# Patient Record
Sex: FEMALE | Race: WHITE
[De-identification: names, ages, dates, MRNs, and addresses within clinical notes are randomized per-mention and may not be internally consistent; named-entity substitution may affect disease eponyms.]

---

## 2019-02-08 ENCOUNTER — HOSPITAL ENCOUNTER (EMERGENCY)
Dept: HOSPITAL 25 - UCCORT | Age: 44
Discharge: HOME | End: 2019-02-08
Payer: COMMERCIAL

## 2019-02-08 VITALS — SYSTOLIC BLOOD PRESSURE: 113 MMHG | DIASTOLIC BLOOD PRESSURE: 75 MMHG

## 2019-02-08 DIAGNOSIS — R42: ICD-10-CM

## 2019-02-08 DIAGNOSIS — M62.830: Primary | ICD-10-CM

## 2019-02-08 PROCEDURE — 99212 OFFICE O/P EST SF 10 MIN: CPT

## 2019-02-08 PROCEDURE — G0463 HOSPITAL OUTPT CLINIC VISIT: HCPCS

## 2019-02-08 PROCEDURE — 81003 URINALYSIS AUTO W/O SCOPE: CPT

## 2019-02-08 NOTE — UC
Back Pain HPI





- HPI Summary


HPI Summary: 





Pt c/o sudden onset of low back pain ~ 2 weeks ago after sitting down on floor 

after brief episode of feeling light headed. Pt sates that since initial 

episode of lightheadedness and sudden sitting to ground, pt has had 

intermittent episodes of low back spasms, pain an dstiffness that  are relieved 

by movement, NSAIDS and stretching. Pt has been doing ADL's with out difficulty 

and has been exercising per norm.  





- History of Current Complaint


Chief Complaint: UCBackPain


Stated Complaint: LOW BACK PAIN


Time Seen by Provider: 02/08/19 16:42


Hx Obtained From: Patient


Pregnant?: No


Onset/Duration: Sudden Onset, Lasting Weeks


Timing: Intermittent, Lasting Minutes


Severity Initially: Moderate


Severity Currently: Mild


Pain Intensity: 0


Back Pain: Is Discrete @ - left lower back


Character: Dull, Aching, Spasmodic, Stiffness


Aggravating Factor(s): Movement, Lifting, Bending


Alleviating Factor(s): Rest, Position


Associated Signs And Symptoms: Positive: Negative





- Risk Factors


AAA Risk Factors: Negative


TAD Risk Factors: Negative


Cauda Equina Risk Factors: Negative


Epidural Abscess Risk Factors: Negative





- Allergies/Home Medications


Allergies/Adverse Reactions: 


 Allergies











Allergy/AdvReac Type Severity Reaction Status Date / Time


 


No Known Allergies Allergy   Verified 02/08/19 16:04














PMH/Surg Hx/FS Hx/Imm Hx


Previously Healthy: Yes





- Surgical History


Surgical History: Yes


Surgery Procedure, Year, and Place: HYSTERECTOMY





- Family History


Known Family History: Positive: Cardiac Disease





- Social History


Occupation: Employed Full-time


Lives: With Family


Alcohol Use: Rare


Substance Use Type: None


Smoking Status (MU): Never Smoked Tobacco


Have You Smoked in the Last Year: No





Review of Systems


All Other Systems Reviewed And Are Negative: Yes


Constitutional: Positive: Negative


Skin: Positive: Negative


Eyes: Positive: Negative


ENT: Positive: Negative


Respiratory: Positive: Negative


Cardiovascular: Positive: Negative


Gastrointestinal: Positive: Negative


Genitourinary: Positive: Negative


Motor: Positive: Negative


Neurovascular: Positive: Negative


Musculoskeletal: Positive: Myalgia - low bakc. left side worse than right


Neurological: Positive: Negative


Psychological: Positive: Negative


Is Patient Immunocompromised?: No





Physical Exam


Triage Information Reviewed: Yes


Appearance: Well-Appearing


Vital Signs: 


 Initial Vital Signs











Temp  97.4 F   02/08/19 16:02


 


Pulse  53   02/08/19 16:02


 


Resp  16   02/08/19 16:02


 


BP  113/75   02/08/19 16:02


 


Pulse Ox  100   02/08/19 16:02











Vital Signs Reviewed: Yes


Eye Exam: Normal


ENT Exam: Normal


Dental Exam: Normal


Neck exam: Normal


Respiratory Exam: Normal


Respiratory: Positive: No respiratory distress


Musculoskeletal Exam: Normal


Musculoskeletal: Positive: Strength Intact, ROM Intact


Neurological Exam: Normal


Psychological Exam: Normal


Skin Exam: Normal





Back Pain Course/Dx





- Differential Dx/Diagnosis


Differential Diagnosis/HQI/PQRI: Herniated Disc, Strain, Other


Provider Diagnosis: 


 Spasm of muscle of lower back








Discharge





- Sign-Out/Discharge


Documenting (check all that apply): Patient Departure


All imaging exams completed and their final reports reviewed: No Studies





- Discharge Plan


Condition: Stable


Disposition: HOME


Prescriptions: 


Cyclobenzaprine TAB* [Flexeril 10 MG TAB*] 10 mg PO TID PRN #15 tab


 PRN Reason: Pain


Patient Education Materials:  Acute Low Back Pain (ED), Muscle Spasm (ED), 

Lower Back Exercises (ED)


Referrals: 


[facility] [Outside] - If Needed


No Primary Care Phys,NOPCP [Primary Care Provider] - 





- Billing Disposition and Condition


Condition: STABLE


Disposition: Home